# Patient Record
Sex: FEMALE | Race: OTHER
[De-identification: names, ages, dates, MRNs, and addresses within clinical notes are randomized per-mention and may not be internally consistent; named-entity substitution may affect disease eponyms.]

---

## 2018-09-26 ENCOUNTER — HOSPITAL ENCOUNTER (EMERGENCY)
Dept: HOSPITAL 7 - FB.ED | Age: 23
LOS: 1 days | Discharge: HOME | End: 2018-09-27
Payer: SELF-PAY

## 2018-09-26 DIAGNOSIS — G44.209: Primary | ICD-10-CM

## 2018-09-26 DIAGNOSIS — R10.13: ICD-10-CM

## 2018-09-26 PROCEDURE — 99284 EMERGENCY DEPT VISIT MOD MDM: CPT

## 2018-09-26 PROCEDURE — 96361 HYDRATE IV INFUSION ADD-ON: CPT

## 2018-09-26 PROCEDURE — 96375 TX/PRO/DX INJ NEW DRUG ADDON: CPT

## 2018-09-26 PROCEDURE — 99283 EMERGENCY DEPT VISIT LOW MDM: CPT

## 2018-09-26 PROCEDURE — 96374 THER/PROPH/DIAG INJ IV PUSH: CPT

## 2018-09-26 NOTE — EDM.PDOC
ED HPI GENERAL MEDICAL PROBLEM





- General


Stated Complaint: NAUSEA,FAINT,HEADACHE,ABD PAIN


Time Seen by Provider: 09/26/18 22:35


Source of Information: Reports: Patient, Family


History Limitations: Reports: No Limitations





- History of Present Illness


INITIAL COMMENTS - FREE TEXT/NARRATIVE: 





c/o epigastric pain x 2d





2d ago pt had epigastric pain and bloating, Gas X helped a little





today with N, ate [employer] food for supper (FF, 3 chicken strips) and had inc

'd N, vomited later





no f/c/d





youngest is 1 yo, has had HAs since last pregnancy, less than 1x qmo, usually 

takes APAP and ibuprofen





- Related Data


 Allergies











Allergy/AdvReac Type Severity Reaction Status Date / Time


 


No Known Allergies Allergy   Verified 09/26/18 23:28











Home Meds: 


 Home Meds





Metoclopramide HCl 10 mg PO Q6H PRN #10 tablet 09/26/18 [Rx]


Omeprazole 20 mg PO DAILY #10 cap.sr 09/26/18 [Rx]











ED ROS GENERAL





- Review of Systems


Review Of Systems: See Below


Constitutional: Reports: No Symptoms


HEENT: Reports: No Symptoms


Respiratory: Reports: No Symptoms


Cardiovascular: Reports: No Symptoms


Endocrine: Reports: No Symptoms


GI/Abdominal: Reports: No Symptoms


: Reports: No Symptoms


Musculoskeletal: Reports: No Symptoms


Skin: Reports: No Symptoms


Neurological: Reports: Headache


Psychiatric: Reports: No Symptoms


Hematologic/Lymphatic: Reports: No Symptoms


Immunologic: Reports: No Symptoms





ED EXAM, GENERAL





- Physical Exam


Exam: See Below


Exam Limited By: No Limitations


General Appearance: Alert, WD/WN, No Apparent Distress, Other (alert, conversant

, overhead lights on, holding forehead in each hand)


Eye Exam: Bilateral Eye: EOMI, Normal Inspection, PERRL


Ears: Normal External Exam


Nose: Normal Inspection, Normal Mucosa, No Blood


Throat/Mouth: Normal Inspection, Normal Lips, Normal Teeth, Normal Gums, Normal 

Oropharynx, Normal Voice, No Airway Compromise


Head: Atraumatic, Normocephalic


Neck: Normal Inspection, Supple, Non-Tender, Full Range of Motion


Respiratory/Chest: No Respiratory Distress, Lungs Clear, Normal Breath Sounds, 

No Accessory Muscle Use, Chest Non-Tender


Cardiovascular: Regular Rate, Rhythm, No Edema, No Gallop, No JVD, No Murmur, 

No Rub


GI/Abdominal: Soft, Non-Tender


Back Exam: Normal Inspection, Full Range of Motion, NT


Extremities: Normal Inspection, Normal Range of Motion, Non-Tender, No Pedal 

Edema


Neurological: Alert, Oriented, CN II-XII Intact, Normal Cognition, No Motor/

Sensory Deficits


Psychiatric: Normal Affect, Normal Mood


Skin Exam: Warm, Dry, Intact, Normal Color, No Rash


Lymphatic: No Adenopathy





Course





- Vital Signs


Last Recorded V/S: 


 Last Vital Signs











Temp  36.8 C   09/26/18 22:20


 


Pulse  81   09/26/18 22:20


 


Resp  18   09/26/18 22:20


 


BP  106/66   09/26/18 22:20


 


Pulse Ox  100   09/26/18 22:20














- Orders/Labs/Meds


Orders: 


 Active Orders 24 hr











 Category Date Time Status


 


 Sodium Chloride 0.9% [Normal Saline] 1,000 ml Med  09/26/18 22:56 Ordered





 IV .BOLUS   








 Medication Orders





Sodium Chloride (Normal Saline)  1,000 mls @ 999 mls/hr IV .BOLUS ONE


   Stop: 09/26/18 23:56


   Last Admin: 09/26/18 23:05 Dose:  999 mls/hr








Meds: 


Medications











Generic Name Dose Route Start Last Admin





  Trade Name Freq  PRN Reason Stop Dose Admin


 


Sodium Chloride  1,000 mls @ 999 mls/hr  09/26/18 22:56  09/26/18 23:05





  Normal Saline  IV  09/26/18 23:56  999 mls/hr





  .BOLUS ONE   Administration





     





     





     





     














Discontinued Medications














Generic Name Dose Route Start Last Admin





  Trade Name Freq  PRN Reason Stop Dose Admin


 


Al Hydroxide/Mg Hydroxide  30 ml  09/26/18 22:57  09/26/18 23:12





  Mag-Al Susp  PO  09/26/18 22:58  30 ml





  NOW STA   Administration





     





     





     





     


 


Ketorolac Tromethamine  30 mg  09/26/18 22:56  09/26/18 23:12





  Toradol  IVPUSH  09/26/18 22:57  30 mg





  ONETIME ONE   Administration





     





     





     





     


 


Lidocaine HCl  15 ml  09/26/18 22:58  09/26/18 23:12





  Xylocaine 2% Viscous  PO  09/26/18 22:59  15 ml





  ONETIME ONE   Administration





     





     





     





     


 


Metoclopramide HCl  10 mg  09/26/18 22:56  09/26/18 23:12





  Reglan  IVPUSH  09/26/18 22:57  10 mg





  ONETIME ONE   Administration





     





     





     





     














- Re-Assessments/Exams


Free Text/Narrative Re-Assessment/Exam: 





09/26/18 23:41


feeling quite a bit better after NS, Toradol IV and Reglan IV. Pain down to 2/10

, N gone.





Departure





- Departure


Time of Disposition: 23:41


Disposition: Home, Self-Care 01


Condition: Good


Clinical Impression: 


 Dyspepsia, Tension headache








- Discharge Information


*PRESCRIPTION DRUG MONITORING PROGRAM REVIEWED*: Not Applicable


*COPY OF PRESCRIPTION DRUG MONITORING REPORT IN PATIENT MARISSA: Not Applicable


Prescriptions: 


Metoclopramide HCl 10 mg PO Q6H PRN #10 tablet


 PRN Reason: Nausea


Omeprazole 20 mg PO DAILY #10 cap.sr


Instructions:  Tension Headache, Adult, Gastroesophageal Reflux Disease, Adult


Referrals: 


PCP,Not In Area [Primary Care Provider] - 


Additional Instructions: 


To decrease acid production, take omeprazole 20 mg 1 capsule daily for 10 days.





To neutralize acid, take liquid antacid 30 ml 4 times a day for 2 days, then 

every 4 hours as needed.





For nausea, take metoclopramide 10 mg 1 tab every 6 hours as needed.





For headache, take ibuprofen 200 mg 3 tabs and acetaminophen 500 mg 2 tabs 

every 6 hours as needed.





See your doctor in the next week.





Call your Physician or Return to Emergency Department if:





   * Your condition worsens in any way.


   * You develop fever greater than 100.4.


   * You have vomitting that does not stop with medications.


   * You have pain that is not controlled with medications.





- My Orders


Last 24 Hours: 


My Active Orders





09/26/18 22:56


Sodium Chloride 0.9% [Normal Saline] 1,000 ml IV .BOLUS 














- Assessment/Plan


Last 24 Hours: 


My Active Orders





09/26/18 22:56


Sodium Chloride 0.9% [Normal Saline] 1,000 ml IV .BOLUS